# Patient Record
Sex: MALE | Race: WHITE
[De-identification: names, ages, dates, MRNs, and addresses within clinical notes are randomized per-mention and may not be internally consistent; named-entity substitution may affect disease eponyms.]

---

## 2020-02-09 ENCOUNTER — HOSPITAL ENCOUNTER (INPATIENT)
Dept: HOSPITAL 92 - ERS | Age: 45
LOS: 2 days | Discharge: HOME | DRG: 310 | End: 2020-02-11
Attending: INTERNAL MEDICINE | Admitting: INTERNAL MEDICINE
Payer: COMMERCIAL

## 2020-02-09 VITALS — BODY MASS INDEX: 29.5 KG/M2

## 2020-02-09 DIAGNOSIS — Z79.899: ICD-10-CM

## 2020-02-09 DIAGNOSIS — E03.9: ICD-10-CM

## 2020-02-09 DIAGNOSIS — I48.0: Primary | ICD-10-CM

## 2020-02-09 LAB
ALBUMIN SERPL BCG-MCNC: 4.7 G/DL (ref 3.5–5)
ALP SERPL-CCNC: 63 U/L (ref 40–110)
ALT SERPL W P-5'-P-CCNC: 22 U/L (ref 8–55)
ANION GAP SERPL CALC-SCNC: 15 MMOL/L (ref 10–20)
AST SERPL-CCNC: 26 U/L (ref 5–34)
BASOPHILS # BLD AUTO: 0.1 THOU/UL (ref 0–0.2)
BASOPHILS NFR BLD AUTO: 1.1 % (ref 0–1)
BILIRUB SERPL-MCNC: 1 MG/DL (ref 0.2–1.2)
BUN SERPL-MCNC: 15 MG/DL (ref 8.9–20.6)
CALCIUM SERPL-MCNC: 9.4 MG/DL (ref 7.8–10.44)
CHLORIDE SERPL-SCNC: 105 MMOL/L (ref 98–107)
CO2 SERPL-SCNC: 26 MMOL/L (ref 22–29)
CREAT CL PREDICTED SERPL C-G-VRATE: 0 ML/MIN (ref 70–130)
EOSINOPHIL # BLD AUTO: 0.1 THOU/UL (ref 0–0.7)
EOSINOPHIL NFR BLD AUTO: 1.6 % (ref 0–10)
GLOBULIN SER CALC-MCNC: 3.4 G/DL (ref 2.4–3.5)
GLUCOSE SERPL-MCNC: 86 MG/DL (ref 70–105)
HGB BLD-MCNC: 16.5 G/DL (ref 14–18)
LYMPHOCYTES # BLD: 2 THOU/UL (ref 1.2–3.4)
LYMPHOCYTES NFR BLD AUTO: 30.5 % (ref 21–51)
MAGNESIUM SERPL-MCNC: 2.1 MG/DL (ref 1.6–2.6)
MCH RBC QN AUTO: 31 PG (ref 27–31)
MCV RBC AUTO: 89.3 FL (ref 78–98)
MONOCYTES # BLD AUTO: 0.5 THOU/UL (ref 0.11–0.59)
MONOCYTES NFR BLD AUTO: 7 % (ref 0–10)
NEUTROPHILS # BLD AUTO: 3.8 THOU/UL (ref 1.4–6.5)
NEUTROPHILS NFR BLD AUTO: 59.7 % (ref 42–75)
PLATELET # BLD AUTO: 235 THOU/UL (ref 130–400)
POTASSIUM SERPL-SCNC: 4 MMOL/L (ref 3.5–5.1)
RBC # BLD AUTO: 5.32 MILL/UL (ref 4.7–6.1)
SODIUM SERPL-SCNC: 142 MMOL/L (ref 136–145)
TROPONIN I SERPL DL<=0.01 NG/ML-MCNC: (no result) NG/ML (ref ?–0.03)
TROPONIN I SERPL DL<=0.01 NG/ML-MCNC: (no result) NG/ML (ref ?–0.03)
WBC # BLD AUTO: 6.4 THOU/UL (ref 4.8–10.8)

## 2020-02-09 PROCEDURE — 96372 THER/PROPH/DIAG INJ SC/IM: CPT

## 2020-02-09 PROCEDURE — 80053 COMPREHEN METABOLIC PANEL: CPT

## 2020-02-09 PROCEDURE — 84484 ASSAY OF TROPONIN QUANT: CPT

## 2020-02-09 PROCEDURE — 84481 FREE ASSAY (FT-3): CPT

## 2020-02-09 PROCEDURE — 85025 COMPLETE CBC W/AUTO DIFF WBC: CPT

## 2020-02-09 PROCEDURE — 71045 X-RAY EXAM CHEST 1 VIEW: CPT

## 2020-02-09 PROCEDURE — 80048 BASIC METABOLIC PNL TOTAL CA: CPT

## 2020-02-09 PROCEDURE — 84439 ASSAY OF FREE THYROXINE: CPT

## 2020-02-09 PROCEDURE — 96376 TX/PRO/DX INJ SAME DRUG ADON: CPT

## 2020-02-09 PROCEDURE — 96366 THER/PROPH/DIAG IV INF ADDON: CPT

## 2020-02-09 PROCEDURE — 96365 THER/PROPH/DIAG IV INF INIT: CPT

## 2020-02-09 PROCEDURE — 84443 ASSAY THYROID STIM HORMONE: CPT

## 2020-02-09 PROCEDURE — 93005 ELECTROCARDIOGRAM TRACING: CPT

## 2020-02-09 PROCEDURE — 83735 ASSAY OF MAGNESIUM: CPT

## 2020-02-09 PROCEDURE — 93306 TTE W/DOPPLER COMPLETE: CPT

## 2020-02-09 PROCEDURE — 36415 COLL VENOUS BLD VENIPUNCTURE: CPT

## 2020-02-09 NOTE — HP
PRIMARY CARE PHYSICIAN:  Herman Noel DO



CHIEF COMPLAINT:  "I felt my heart racing and chest tightness."



HISTORY OF PRESENT ILLNESS:  Mr. Amaya is a pleasant 44-year-old gentleman, who

has a history of hyperthyroidism, status post thyroid ablation.  He was in his usual

state of health until Wednesday.  He says he was making a presentation when he

started to notice some shortness of breath and he could feel his heart racing.  He

said this lasted about 10 to 15 minutes.  The next day on Thursday, he felt okay.

The following day on Friday, he noticed some shortness of breath as well and he also

felt his heart beating hard again and at this time, it lasted about half hour to an

hour.  It happened again on Friday evening.  He was noticing that he was belching a

lot during the time, but there was no nausea, no vomiting, no dizziness, no

lightheadedness.  He says that he has been fairly active and has not noticed any

chest pain or difficulty when he is out moving around and he had a similar episode

happen several years ago.  This is when he was found to have hyperthyroidism and he

had a thyroid ablation and has been fine ever since. 



REVIEW OF SYSTEMS:  All systems were reviewed and are negative except for that

mentioned in the history of present illness. 



PAST MEDICAL HISTORY:  Significant for hyperthyroidism.



PAST SURGICAL HISTORY:  He has had a thyroid ablation.



ALLERGIES:  NO KNOWN DRUG ALLERGIES.



SOCIAL HISTORY:  He works as a professor.  He is , has 2 children.  He denies

any smoking.  He does drink a beer occasionally a week. 



FAMILY HISTORY:  Significant for diabetes.  His sister had diabetes as well as

grandparents and it is a combination of both type 1 and type 2 diabetes.  Father had

atrial fibrillation.  Mother had breast cancer. 



CURRENT MEDICATION:  Synthroid 75 mcg p.o.



PHYSICAL EXAMINATION:

GENERAL:  He is alert and oriented.  He appears to be in no acute distress.  He is

well developed and well nourished. 

VITAL SIGNS:  Blood pressure 149/98; respiratory rate of 20; heart rate of 92 and at

the highest, the heart rate was 150. 

HEENT:  Pupils are equal, round, and reactive.  Extraocular muscles are intact.  His

sclerae are anicteric.  Throat, there is no erythema.  No exudates. 

NECK:  No adenopathy.  No bruits. 

LUNGS:  Clear to auscultation.  There are no wheezing, no rales, no rhonchi. 

CARDIOVASCULAR:  He has a normal S1 and S2.  I did not appreciate an S3 or S4.  No

murmurs, clicks, or rubs. 

ABDOMEN:  Soft.  It is nontender and nondistended.  Positive for bowel sounds.

There is no rebound, no guarding, no organomegaly. 

EXTREMITIES:  There is no clubbing or cyanosis.  No edema.  No calf tenderness.  No

joint effusions. 

NEUROLOGIC:  The exam is nonfocal. 

SKIN AND INTEGUMENT:  No skin changes.  No rash.



LABORATORY RESULTS:  The white blood cell count is 6.4, hemoglobin 16.5, hematocrit

is 47.5, and platelet count is 235.  His chemistry panel was completely normal.  He

had a TSH, which was normal, but his EKG, it is sinus.  It is a narrow complex

tachycardia, possibly SVT or atrial fibrillation.  The heart rate was around 158. 



ASSESSMENT AND PLAN:  

1. This is a pleasant 44-year-old gentleman, who presents to the emergency room with

a narrow complex tachycardia.  He has been placed on a Cardizem drip to control his

rate.  This is new onset and it required Cardizem to control the rate.  Therefore,

he will be admitted inpatient.  We will get an echocardiogram.  Continue the

Cardizem and consult Cardiology for further recommendations. 

2. Hyperthyroidism, status post ablation.  His TSH is currently negative and he

appears clinically euthyroid as well.  We will continue his Synthroid at the current

dose and deep venous thrombosis prophylaxis will be performed with SCDs. 







Job ID:  [id number]

## 2020-02-09 NOTE — RAD
EXAM:

Portable chest



PROVIDED CLINICAL HISTORY:

Chest pain



COMPARISON:

9/14/2013



FINDINGS: 

Cardiac and mediastinal silhouette is within normal limits. No focal consolidation, pleural fluid or 
pneumothorax evident.



IMPRESSION:

No evidence for an acute cardiopulmonary process.



Reported By: Rubén Rosales 

Electronically Signed:  2/9/2020 10:00 AM

## 2020-02-10 LAB
ANION GAP SERPL CALC-SCNC: 12 MMOL/L (ref 10–20)
BASOPHILS # BLD AUTO: 0 THOU/UL (ref 0–0.2)
BASOPHILS NFR BLD AUTO: 0.7 % (ref 0–1)
BUN SERPL-MCNC: 10 MG/DL (ref 8.9–20.6)
CALCIUM SERPL-MCNC: 8.8 MG/DL (ref 7.8–10.44)
CHLORIDE SERPL-SCNC: 104 MMOL/L (ref 98–107)
CO2 SERPL-SCNC: 27 MMOL/L (ref 22–29)
CREAT CL PREDICTED SERPL C-G-VRATE: 153 ML/MIN (ref 70–130)
EOSINOPHIL # BLD AUTO: 0.1 THOU/UL (ref 0–0.7)
EOSINOPHIL NFR BLD AUTO: 1.3 % (ref 0–10)
GLUCOSE SERPL-MCNC: 88 MG/DL (ref 70–105)
HGB BLD-MCNC: 15 G/DL (ref 14–18)
LYMPHOCYTES # BLD: 2.5 THOU/UL (ref 1.2–3.4)
LYMPHOCYTES NFR BLD AUTO: 38.2 % (ref 21–51)
MCH RBC QN AUTO: 29.8 PG (ref 27–31)
MCV RBC AUTO: 88.8 FL (ref 78–98)
MONOCYTES # BLD AUTO: 0.5 THOU/UL (ref 0.11–0.59)
MONOCYTES NFR BLD AUTO: 7.8 % (ref 0–10)
NEUTROPHILS # BLD AUTO: 3.5 THOU/UL (ref 1.4–6.5)
NEUTROPHILS NFR BLD AUTO: 52 % (ref 42–75)
PLATELET # BLD AUTO: 227 THOU/UL (ref 130–400)
POTASSIUM SERPL-SCNC: 3.9 MMOL/L (ref 3.5–5.1)
RBC # BLD AUTO: 5.01 MILL/UL (ref 4.7–6.1)
SODIUM SERPL-SCNC: 139 MMOL/L (ref 136–145)
T4 FREE SERPL-MCNC: 1.28 NG/DL (ref 0.7–1.48)
WBC # BLD AUTO: 6.6 THOU/UL (ref 4.8–10.8)

## 2020-02-10 RX ADMIN — Medication SCH ML: at 10:05

## 2020-02-10 RX ADMIN — Medication SCH ML: at 21:33

## 2020-02-10 NOTE — CON
DATE OF CONSULTATION:  



HISTORY OF PRESENT ILLNESS:  Mr. Trenton Amaya is a 44-year-old gentleman

admitted with atrial fibrillation with fast ventricular response.  He first

developed atrial fibrillation in his mid 20s when he was found to be hyperthyroid.

He ultimately underwent I-131 ablation of his thyroid and has been on replacement

therapy since.  Over the years, he has intermittently had atrial fibrillation.  He

was evaluated by Dr. Marin in the hospital in September 2013.  It was felt that he

probably was having PVCs and no atrial fibrillation was documented.  He was seen by

Dr. Marin and it was felt that he had appearance of ventricular contractions. The

patient underwent Cardiolite treadmill testing, which revealed no evidence of

ischemia. 



Mr. Amaya states his last episode of rapid heartbeat was 2 years ago.  He then

went to an out of town conference and was not getting much rest and for the 1st time

in 5 years, drank a lot of caffeinated beverages.  He then started noticing

increasing episodes of rapid heartbeat associated with some chest pressure.  He came

to the emergency room yesterday and was found to be in atrial fibrillation with

rapid ventricular response, was started on intravenous Cardizem and converted to

sinus rhythm.  However, he continues to intermittently have breakthrough atrial

fibrillation.  At the present time, his last caffeinated beverage was 48 hours ago. 



PAST MEDICAL HISTORY:  History of hyperthyroidism, status post I-131 ablation.



OPERATIONS:  None.



MEDICATIONS:  Levothyroxine 150 mcg daily.



ALLERGIES:  NONE.



SOCIAL HISTORY:  He does not smoke.  He occasionally has a beer.  He states he

stopped caffeinated beverages 5 years ago, but has had some over the last 4 or 5

days. 



FAMILY HISTORY:  Father had atrial fibrillation.



REVIEW OF SYSTEMS:  A 10-point review of systems is otherwise unremarkable.



PHYSICAL EXAMINATION:

VITAL SIGNS:  Blood pressure 120/78, pulse 70. 

HEENT:  PERRL. 

NECK:  Supple. 

CHEST:  Clear. 

CARDIAC:  S1 and S2 normal without any S3, S4, or murmurs. 

ABDOMEN:  Normal bowel sounds without tenderness or organomegaly. 

EXTREMITIES:  Revealed no clubbing, cyanosis, or edema. 

NEUROLOGIC:  Grossly intact. 

SKIN:  Warm and dry.



LABORATORY DATA:  

1. EKG on admission revealed atrial fibrillation with rate of 158 per minute.  

2. Cardiac enzymes are unremarkable.  

3. CBC is normal.  Sodium 139, potassium 3.9, chloride 104, carbon dioxide 27, BUN

10 and creatinine 1.02. 

4. Echocardiogram revealed ejection fraction of 55% to 60% mild mitral

regurgitation, mild tricuspid regurgitation.  TSH is normal. 



IMPRESSION:  

1. Paroxysmal atrial fibrillation since his mid 20s, although the last episode was 2

years ago until he had significant caffeine intake recently. 

2. Status post I-131 ablation for hyperthyroidism, currently hypothyroid.



RECOMMENDATIONS:  Mr. Amaya's CHADS-VASc score is less than 2 and he will be

placed on aspirin 325 q.a.m.  These episodes temporally seem to be related to a high

caffeine intake.  I will have the Cardizem tapered.  If he does recur, then

consideration should be given to suppressive therapy with flecainide.  If it does

not recur, then I would not place him on suppressive therapy at this time.  With his

last episode 2 years ago, I would just advise avoiding caffeine.  Also, free T4 and

free T3 will be obtained. 







Job ID:  [id number]

## 2020-02-10 NOTE — PDOC.HOSPP
- Subjective


Encounter Date: 02/10/20


Encounter Time: 11:54


Subjective: 





Mr. Amaya was seen today in follow-up of narrow complex tachycardia. He 

does not have any complaints. He denies feeling short of breath, no dizziness 

or lightheadedness. 





- Objective


Vital Signs & Weight: 


 Vital Signs (12 hours)











  Temp Pulse Resp BP BP Pulse Ox


 


 02/10/20 07:37  98.4 F  90  18  131/87   97


 


 02/10/20 04:00  97.6 F  69  18   125/80  96


 


 02/10/20 00:00   73   139/75  








 Weight











Weight                         258 lb














I&O: 


 











 02/09/20 02/10/20 02/11/20





 06:59 06:59 06:59


 


Intake Total  2117.7 


 


Output Total  2550 


 


Balance  -432.3 











Result Diagrams: 


 02/10/20 03:49





 02/10/20 03:49





Hospitalist ROS





- Medication


Medications: 


Active Medications











Generic Name Dose Route Start Last Admin





  Trade Name Freq  PRN Reason Stop Dose Admin


 


Acetaminophen  650 mg  02/09/20 13:43  02/09/20 20:25





  Tylenol  PO   650 mg





  Q4H PRN   Administration





  Headache/Fever/Mild Pain (1-3)   





     





     





     


 


Famotidine  20 mg  02/09/20 21:00  02/10/20 10:04





  Pepcid  PO   20 mg





  BID KIERRA   Administration





     





     





     





     


 


Levothyroxine Sodium  175 mcg  02/10/20 09:00  02/10/20 10:04





  Synthroid  PO   Not Given





  DAILY KIERRA   





     





     





     





     


 


Sodium Chloride  10 ml  02/10/20 09:00  02/10/20 10:05





  Flush - Normal Saline  IVF   10 ml





  Q12HR KIERRA   Administration





     





     





     





     














- Exam


Eye: PERRL


Respiratory: CTAB, no wheezes, no rales, no ronchi, normal chest expansion, no 

tachypnea


Gastrointestinal: soft, non-tender, non-distended, normal bowel sounds, no 

palpable masses, no hepatomegaly, no splenomegaly, no bruit


Extremities: no cyanosis, no clubbing, no edema





Hosp A/P


(1) Arrhythmia, atrial


Code(s): I49.8 - OTHER SPECIFIED CARDIAC ARRHYTHMIAS   Status: Acute   





(2) Hypothyroidism


Code(s): E03.9 - HYPOTHYROIDISM, UNSPECIFIED   Status: Chronic   





- Plan





* Narrow complex Tachycardia- ? etiology- possibly AFIB/Aflutter, SVT- will 

await Cardiology recommendations


* Echo noted


* Hypothyroidism- clinically stable

## 2020-02-11 VITALS — TEMPERATURE: 98.1 F | DIASTOLIC BLOOD PRESSURE: 87 MMHG | SYSTOLIC BLOOD PRESSURE: 124 MMHG

## 2020-02-11 RX ADMIN — Medication SCH ML: at 08:18

## 2020-02-11 NOTE — PDOC.HOSPP
- Subjective


Encounter Date: 02/11/20


Encounter Time: 11:29


Subjective: 





Mr. Amaya was seen today in follow-up of AFIB with RVR. He does not have 

any complaints.





- Objective


Vital Signs & Weight: 


 Vital Signs (12 hours)











  Temp Pulse Resp BP BP Pulse Ox


 


 02/11/20 11:00  98.1 F  78  18   124/87  99


 


 02/11/20 07:06  97.5 F L  73  18   124/84  97


 


 02/10/20 23:44   60   122/63  








 Weight











Weight                         257 lb 3.2 oz














I&O: 


 











 02/10/20 02/11/20 02/12/20





 06:59 06:59 06:59


 


Intake Total 2117.7 3066 


 


Output Total 2550 2640 


 


Balance -432.3 426 











Result Diagrams: 


 02/10/20 03:49





 02/10/20 03:49





Hospitalist ROS





- Medication


Medications: 


Active Medications











Generic Name Dose Route Start Last Admin





  Trade Name Freq  PRN Reason Stop Dose Admin


 


Acetaminophen  650 mg  02/09/20 13:43  02/09/20 20:25





  Tylenol  PO   650 mg





  Q4H PRN   Administration





  Headache/Fever/Mild Pain (1-3)   





     





     





     


 


Aspirin  325 mg  02/11/20 09:00  02/11/20 08:18





  Ecotrin  PO   325 mg





  DAILY KIERRA   Administration





     





     





     





     


 


Famotidine  20 mg  02/09/20 21:00  02/11/20 08:18





  Pepcid  PO   20 mg





  BID KIERRA   Administration





     





     





     





     


 


Flecainide Acetate  50 mg  02/11/20 09:00  02/11/20 09:07





  Tambocor  PO   50 mg





  Q12HR KIERRA   Administration





     





     





     





     


 


Levothyroxine Sodium  150 mcg  02/11/20 06:00  02/11/20 05:58





  Synthroid  PO   150 mcg





  0600 KIERRA   Administration





     





     





     





     


 


Sodium Chloride  10 ml  02/10/20 09:00  02/11/20 08:18





  Flush - Normal Saline  IVF   10 ml





  Q12HR KIERRA   Administration





     





     





     





     














- Exam


Eye: PERRL


Heart: RRR, no murmur, no gallops, no rubs, normal peripheral pulses


Respiratory: CTAB, no wheezes, no rales, no ronchi, normal chest expansion, no 

tachypnea, normal percussion


Gastrointestinal: soft, non-tender, non-distended, normal bowel sounds, no 

palpable masses, no hepatomegaly


Extremities: no cyanosis, no clubbing, no edema





Hosp A/P


(1) Arrhythmia, atrial


Code(s): I49.8 - OTHER SPECIFIED CARDIAC ARRHYTHMIAS   Status: Acute   





(2) Hypothyroidism


Code(s): E03.9 - HYPOTHYROIDISM, UNSPECIFIED   Status: Chronic   





- Plan





* AFIB with RVR - resolved


* Cardiology recommendations noted


* Will continue Flecanide, and Aspirin, Avoid Caffeine


* Plan is to place Event Monitor

## 2020-02-12 NOTE — DIS
DATE OF ADMISSION:  02/09/2020



DATE OF DISCHARGE:  02/11/2020



DISCHARGE DISPOSITION:  Home.



DISCHARGE DIAGNOSES:  

1. Paroxysmal atrial fibrillation.

2. Hypothyroidism.



DISCHARGE MEDICATIONS:  Include:

1. Tambocor 50 mg twice daily.

2. Aspirin 325 mg daily.

3. Levothyroxine 150 mcg daily.



PROCEDURES DONE DURING ADMISSION:  Patient had an echocardiogram, which was

essentially normal, normal ejection fraction of 50% to 55%. 



CODE STATUS:  Full code.



ALLERGIES:  NO KNOWN DRUG ALLERGIES.



HOSPITAL COURSE:  Mr. Amaya is a pleasant 44-year-old gentleman, who presented

to the emergency room after having palpitations.  He was found to be in atrial

fibrillation with rapid ventricular response.  He was placed on IV Cardizem and

admitted.  Cardiology was consulted.  He underwent echocardiogram as well.  The

echocardiogram was essentially negative and the atrial fibrillation was very

variable and paroxysmal.  Each episode lasting no more than about 30 minutes.  He

did admit to a recent load on caffeine and has been counseled on the need to avoid

caffeine in the future.  He is also going to follow up with Dr. Garcia and have an

event monitor placed and at the time of discharge he was back in sinus rhythm.

Again, placed on flecainide and aspirin and will need close outpatient followup. 







Job ID:  [id number]

## 2023-07-10 ENCOUNTER — HOSPITAL ENCOUNTER (EMERGENCY)
Dept: HOSPITAL 92 - ERS | Age: 48
Discharge: HOME | End: 2023-07-10
Payer: COMMERCIAL

## 2023-07-10 DIAGNOSIS — J01.90: ICD-10-CM

## 2023-07-10 DIAGNOSIS — H81.09: Primary | ICD-10-CM

## 2023-07-10 DIAGNOSIS — I10: ICD-10-CM

## 2023-07-10 DIAGNOSIS — H10.9: ICD-10-CM

## 2023-07-10 LAB
ALBUMIN SERPL BCG-MCNC: 4.5 G/DL (ref 3.5–5)
ALP SERPL-CCNC: 52 U/L (ref 40–110)
ALT SERPL W P-5'-P-CCNC: 15 U/L (ref 8–55)
ANION GAP SERPL CALC-SCNC: 15 MMOL/L (ref 10–20)
AST SERPL-CCNC: 15 U/L (ref 5–34)
BASOPHILS # BLD AUTO: 0 THOU/UL (ref 0–0.2)
BASOPHILS NFR BLD AUTO: 0.4 % (ref 0–1)
BILIRUB SERPL-MCNC: 0.5 MG/DL (ref 0.2–1.2)
BUN SERPL-MCNC: 12 MG/DL (ref 8.9–20.6)
CALCIUM SERPL-MCNC: 9.1 MG/DL (ref 7.8–10.44)
CHLORIDE SERPL-SCNC: 106 MMOL/L (ref 98–107)
CO2 SERPL-SCNC: 22 MMOL/L (ref 22–29)
CREAT CL PREDICTED SERPL C-G-VRATE: 0 ML/MIN (ref 70–130)
EOSINOPHIL # BLD AUTO: 0 THOU/UL (ref 0–0.7)
EOSINOPHIL NFR BLD AUTO: 0.4 % (ref 0–10)
GLOBULIN SER CALC-MCNC: 2.9 G/DL (ref 2.4–3.5)
GLUCOSE SERPL-MCNC: 115 MG/DL (ref 70–105)
HGB BLD-MCNC: 14 G/DL (ref 14–18)
LYMPHOCYTES NFR BLD AUTO: 12 % (ref 21–51)
MCH RBC QN AUTO: 30.4 PG (ref 27–31)
MCV RBC AUTO: 89.6 FL (ref 78–98)
MONOCYTES # BLD AUTO: 0.4 THOU/UL (ref 0.11–0.59)
MONOCYTES NFR BLD AUTO: 5.3 % (ref 0–10)
NEUTROPHILS # BLD AUTO: 6.1 THOU/UL (ref 1.4–6.5)
NEUTROPHILS NFR BLD AUTO: 81.6 % (ref 42–75)
PLATELET # BLD AUTO: 177 10X3/UL (ref 130–400)
POTASSIUM SERPL-SCNC: 3.7 MMOL/L (ref 3.5–5.1)
RBC # BLD AUTO: 4.6 MILL/UL (ref 4.7–6.1)
SODIUM SERPL-SCNC: 139 MMOL/L (ref 136–145)
WBC # BLD AUTO: 7.4 10X3/UL (ref 4.8–10.8)

## 2023-07-10 PROCEDURE — 85025 COMPLETE CBC W/AUTO DIFF WBC: CPT

## 2023-07-10 PROCEDURE — 96375 TX/PRO/DX INJ NEW DRUG ADDON: CPT

## 2023-07-10 PROCEDURE — 70450 CT HEAD/BRAIN W/O DYE: CPT

## 2023-07-10 PROCEDURE — 96374 THER/PROPH/DIAG INJ IV PUSH: CPT

## 2023-07-10 PROCEDURE — 80053 COMPREHEN METABOLIC PANEL: CPT

## 2023-07-10 PROCEDURE — 36415 COLL VENOUS BLD VENIPUNCTURE: CPT
